# Patient Record
Sex: FEMALE | Race: WHITE
[De-identification: names, ages, dates, MRNs, and addresses within clinical notes are randomized per-mention and may not be internally consistent; named-entity substitution may affect disease eponyms.]

---

## 2017-03-29 ENCOUNTER — RESULT REVIEW (OUTPATIENT)
Age: 62
End: 2017-03-29

## 2018-04-12 ENCOUNTER — RESULT REVIEW (OUTPATIENT)
Age: 63
End: 2018-04-12

## 2019-05-10 ENCOUNTER — RESULT REVIEW (OUTPATIENT)
Age: 64
End: 2019-05-10

## 2019-06-07 ENCOUNTER — APPOINTMENT (OUTPATIENT)
Dept: PULMONOLOGY | Facility: CLINIC | Age: 64
End: 2019-06-07

## 2019-07-02 ENCOUNTER — APPOINTMENT (OUTPATIENT)
Dept: PULMONOLOGY | Facility: CLINIC | Age: 64
End: 2019-07-02
Payer: MEDICARE

## 2019-07-02 VITALS
DIASTOLIC BLOOD PRESSURE: 70 MMHG | WEIGHT: 185 LBS | HEART RATE: 94 BPM | OXYGEN SATURATION: 98 % | SYSTOLIC BLOOD PRESSURE: 140 MMHG | BODY MASS INDEX: 30.82 KG/M2 | HEIGHT: 65 IN

## 2019-07-02 DIAGNOSIS — Z87.891 PERSONAL HISTORY OF NICOTINE DEPENDENCE: ICD-10-CM

## 2019-07-02 DIAGNOSIS — Z85.43 PERSONAL HISTORY OF MALIGNANT NEOPLASM OF OVARY: ICD-10-CM

## 2019-07-02 DIAGNOSIS — Z82.49 FAMILY HISTORY OF ISCHEMIC HEART DISEASE AND OTHER DISEASES OF THE CIRCULATORY SYSTEM: ICD-10-CM

## 2019-07-02 DIAGNOSIS — Z83.3 FAMILY HISTORY OF DIABETES MELLITUS: ICD-10-CM

## 2019-07-02 DIAGNOSIS — H81.09 MENIERE'S DISEASE, UNSPECIFIED EAR: ICD-10-CM

## 2019-07-02 PROCEDURE — 94729 DIFFUSING CAPACITY: CPT

## 2019-07-02 PROCEDURE — 85018 HEMOGLOBIN: CPT | Mod: QW

## 2019-07-02 PROCEDURE — 99205 OFFICE O/P NEW HI 60 MIN: CPT | Mod: 25

## 2019-07-02 PROCEDURE — 94010 BREATHING CAPACITY TEST: CPT

## 2019-07-02 PROCEDURE — 94727 GAS DIL/WSHOT DETER LNG VOL: CPT

## 2019-07-02 NOTE — REVIEW OF SYSTEMS
[Fatigue] : fatigue [Heartburn] : heartburn [Hypertension] : ~T hypertension [Reflux] : reflux [Indigestion] : indigestion [As Noted in HPI] : as noted in HPI [Unusual Sleep Behavior] : unusual sleep behavior [Dizziness] : dizziness [Negative] : Pulmonary Hypertension

## 2019-07-02 NOTE — PHYSICAL EXAM
[General Appearance - Well Developed] : well developed [Well Groomed] : well groomed [Normal Appearance] : normal appearance [General Appearance - Well Nourished] : well nourished [No Deformities] : no deformities [General Appearance - In No Acute Distress] : no acute distress [Normal Conjunctiva] : the conjunctiva exhibited no abnormalities [Eyelids - No Xanthelasma] : the eyelids demonstrated no xanthelasmas [Normal Oropharynx] : normal oropharynx [Neck Appearance] : the appearance of the neck was normal [Jugular Venous Distention Increased] : there was no jugular-venous distention [Neck Cervical Mass (___cm)] : no neck mass was observed [Thyroid Nodule] : there were no palpable thyroid nodules [Heart Rate And Rhythm] : heart rate and rhythm were normal [Thyroid Diffuse Enlargement] : the thyroid was not enlarged [Heart Sounds] : normal S1 and S2 [Murmurs] : no murmurs present [Respiration, Rhythm And Depth] : normal respiratory rhythm and effort [Auscultation Breath Sounds / Voice Sounds] : lungs were clear to auscultation bilaterally [Exaggerated Use Of Accessory Muscles For Inspiration] : no accessory muscle use [Abdomen Tenderness] : non-tender [Abdomen Soft] : soft [Abdomen Mass (___ Cm)] : no abdominal mass palpated [Abnormal Walk] : normal gait [Gait - Sufficient For Exercise Testing] : the gait was sufficient for exercise testing [Nail Clubbing] : no clubbing of the fingernails [Petechial Hemorrhages (___cm)] : no petechial hemorrhages [] : no ischemic changes [Cyanosis, Localized] : no localized cyanosis [Oriented To Time, Place, And Person] : oriented to person, place, and time [Impaired Insight] : insight and judgment were intact [No Focal Deficits] : no focal deficits [Affect] : the affect was normal

## 2019-07-03 NOTE — REASON FOR VISIT
[Consultation] : a consultation visit [Chest Pain] : chest Pain [Shortness of Breath] : shortness of Breath [Cough] : cough

## 2019-07-03 NOTE — CONSULT LETTER
[Dear  ___] : Dear  [unfilled], [Please see my note below.] : Please see my note below. [Consult Letter:] : I had the pleasure of evaluating your patient, [unfilled]. [Consult Closing:] : Thank you very much for allowing me to participate in the care of this patient.  If you have any questions, please do not hesitate to contact me. [DrIssa  ___] : Dr. VELASCO [Sincerely,] : Sincerely, [FreeTextEntry3] : Pérez Haynes MD\par

## 2019-07-15 LAB — DEPRECATED D DIMER PPP IA-ACNC: <150 NG/ML DDU

## 2019-08-14 ENCOUNTER — APPOINTMENT (OUTPATIENT)
Dept: PULMONOLOGY | Facility: CLINIC | Age: 64
End: 2019-08-14
Payer: MEDICARE

## 2019-08-14 VITALS — DIASTOLIC BLOOD PRESSURE: 68 MMHG | SYSTOLIC BLOOD PRESSURE: 100 MMHG | OXYGEN SATURATION: 97 % | HEART RATE: 101 BPM

## 2019-08-14 VITALS — WEIGHT: 182 LBS | BODY MASS INDEX: 30.32 KG/M2 | HEIGHT: 65 IN

## 2019-08-14 DIAGNOSIS — R07.89 OTHER CHEST PAIN: ICD-10-CM

## 2019-08-14 DIAGNOSIS — K21.9 GASTRO-ESOPHAGEAL REFLUX DISEASE W/OUT ESOPHAGITIS: ICD-10-CM

## 2019-08-14 DIAGNOSIS — R05 COUGH: ICD-10-CM

## 2019-08-14 PROCEDURE — 99213 OFFICE O/P EST LOW 20 MIN: CPT

## 2019-08-14 NOTE — REVIEW OF SYSTEMS
[Hypertension] : ~T hypertension [Heartburn] : heartburn [Reflux] : reflux [Indigestion] : indigestion [Dizziness] : dizziness [As Noted in HPI] : as noted in HPI [Unusual Sleep Behavior] : unusual sleep behavior [Negative] : Pulmonary Hypertension

## 2019-08-14 NOTE — PHYSICAL EXAM
[General Appearance - Well Developed] : well developed [Normal Appearance] : normal appearance [Well Groomed] : well groomed [General Appearance - Well Nourished] : well nourished [No Deformities] : no deformities [General Appearance - In No Acute Distress] : no acute distress [Normal Conjunctiva] : the conjunctiva exhibited no abnormalities [Eyelids - No Xanthelasma] : the eyelids demonstrated no xanthelasmas [Normal Oropharynx] : normal oropharynx [II] : II [Neck Cervical Mass (___cm)] : no neck mass was observed [Neck Appearance] : the appearance of the neck was normal [Jugular Venous Distention Increased] : there was no jugular-venous distention [Thyroid Diffuse Enlargement] : the thyroid was not enlarged [Thyroid Nodule] : there were no palpable thyroid nodules [Heart Rate And Rhythm] : heart rate and rhythm were normal [Heart Sounds] : normal S1 and S2 [Murmurs] : no murmurs present [Respiration, Rhythm And Depth] : normal respiratory rhythm and effort [Exaggerated Use Of Accessory Muscles For Inspiration] : no accessory muscle use [Auscultation Breath Sounds / Voice Sounds] : lungs were clear to auscultation bilaterally [Abdomen Soft] : soft [Abdomen Tenderness] : non-tender [Abdomen Mass (___ Cm)] : no abdominal mass palpated [Abnormal Walk] : normal gait [Gait - Sufficient For Exercise Testing] : the gait was sufficient for exercise testing [Nail Clubbing] : no clubbing of the fingernails [Cyanosis, Localized] : no localized cyanosis [Petechial Hemorrhages (___cm)] : no petechial hemorrhages [] : no ischemic changes [No Focal Deficits] : no focal deficits [Oriented To Time, Place, And Person] : oriented to person, place, and time [Impaired Insight] : insight and judgment were intact [Affect] : the affect was normal

## 2019-08-30 ENCOUNTER — RX RENEWAL (OUTPATIENT)
Age: 64
End: 2019-08-30

## 2020-06-15 ENCOUNTER — RESULT REVIEW (OUTPATIENT)
Age: 65
End: 2020-06-15

## 2021-06-17 ENCOUNTER — RESULT REVIEW (OUTPATIENT)
Age: 66
End: 2021-06-17

## 2022-07-19 ENCOUNTER — RESULT REVIEW (OUTPATIENT)
Age: 67
End: 2022-07-19

## 2022-09-29 ENCOUNTER — NON-APPOINTMENT (OUTPATIENT)
Age: 67
End: 2022-09-29

## 2023-07-20 ENCOUNTER — NON-APPOINTMENT (OUTPATIENT)
Age: 68
End: 2023-07-20

## 2023-07-20 DIAGNOSIS — M79.10 MYALGIA, UNSPECIFIED SITE: ICD-10-CM

## 2023-07-20 DIAGNOSIS — Z82.49 FAMILY HISTORY OF ISCHEMIC HEART DISEASE AND OTHER DISEASES OF THE CIRCULATORY SYSTEM: ICD-10-CM

## 2023-07-20 DIAGNOSIS — Z92.89 PERSONAL HISTORY OF OTHER MEDICAL TREATMENT: ICD-10-CM

## 2023-07-20 DIAGNOSIS — Z87.898 PERSONAL HISTORY OF OTHER SPECIFIED CONDITIONS: ICD-10-CM

## 2023-07-20 DIAGNOSIS — R00.2 PALPITATIONS: ICD-10-CM

## 2023-07-20 RX ORDER — LEVOTHYROXINE SODIUM 0.03 MG/1
25 TABLET ORAL DAILY
Refills: 0 | Status: ACTIVE | COMMUNITY

## 2023-07-20 RX ORDER — CETIRIZINE HCL 10 MG
10 TABLET ORAL DAILY
Refills: 0 | Status: ACTIVE | COMMUNITY

## 2023-07-20 RX ORDER — ESTRADIOL 0.1 MG/D
0.1 PATCH TRANSDERMAL
Refills: 0 | Status: ACTIVE | COMMUNITY

## 2023-07-20 RX ORDER — VALSARTAN 320 MG/1
320 TABLET, COATED ORAL DAILY
Refills: 0 | Status: ACTIVE | COMMUNITY

## 2023-07-23 ENCOUNTER — NON-APPOINTMENT (OUTPATIENT)
Age: 68
End: 2023-07-23

## 2023-07-27 ENCOUNTER — NON-APPOINTMENT (OUTPATIENT)
Age: 68
End: 2023-07-27

## 2023-07-31 ENCOUNTER — APPOINTMENT (OUTPATIENT)
Dept: CARDIOLOGY | Facility: CLINIC | Age: 68
End: 2023-07-31
Payer: MEDICARE

## 2023-07-31 VITALS
BODY MASS INDEX: 27.8 KG/M2 | HEIGHT: 66 IN | OXYGEN SATURATION: 98 % | TEMPERATURE: 98.7 F | SYSTOLIC BLOOD PRESSURE: 146 MMHG | HEART RATE: 84 BPM | DIASTOLIC BLOOD PRESSURE: 80 MMHG | WEIGHT: 173 LBS

## 2023-07-31 DIAGNOSIS — R06.02 SHORTNESS OF BREATH: ICD-10-CM

## 2023-07-31 PROCEDURE — 93000 ELECTROCARDIOGRAM COMPLETE: CPT

## 2023-07-31 PROCEDURE — 99214 OFFICE O/P EST MOD 30 MIN: CPT

## 2023-07-31 RX ORDER — RANITIDINE 300 MG/1
300 TABLET ORAL
Qty: 90 | Refills: 3 | Status: DISCONTINUED | COMMUNITY
Start: 2019-07-02 | End: 2023-07-31

## 2023-07-31 RX ORDER — IRBESARTAN AND HYDROCHLOROTHIAZIDE 150; 12.5 MG/1; MG/1
150-12.5 TABLET, FILM COATED ORAL
Refills: 0 | Status: DISCONTINUED | COMMUNITY
End: 2023-07-31

## 2023-07-31 RX ORDER — EZETIMIBE 10 MG/1
10 TABLET ORAL
Refills: 0 | Status: DISCONTINUED | COMMUNITY
End: 2023-07-31

## 2023-07-31 RX ORDER — CLONIDINE HYDROCHLORIDE 0.1 MG/1
0.1 TABLET ORAL TWICE DAILY
Refills: 0 | Status: DISCONTINUED | COMMUNITY
End: 2023-07-31

## 2023-07-31 RX ORDER — POTASSIUM CHLORIDE 1500 MG/1
20 TABLET, EXTENDED RELEASE ORAL
Refills: 0 | Status: DISCONTINUED | COMMUNITY
End: 2023-07-31

## 2023-07-31 RX ORDER — OMEPRAZOLE 40 MG/1
40 CAPSULE, DELAYED RELEASE ORAL
Qty: 1 | Refills: 3 | Status: DISCONTINUED | COMMUNITY
End: 2023-07-31

## 2023-07-31 RX ORDER — ACETAZOLAMIDE 250 MG
250 TABLET ORAL
Refills: 0 | Status: DISCONTINUED | COMMUNITY
End: 2023-07-31

## 2023-07-31 NOTE — CARDIOLOGY SUMMARY
[de-identified] : Active Problems A- Abnormal LFTs: Resolved A- ECHOCARDIOGRAM: 3/24/2022, LVH, EF>65%, mild aortic insufficiency, trace mitral regurgitation and tricuspid regurgitation RVSP 16 - PHARMACOLOGIC NUCLEAR STRESS TEST: 3/29/2022, normal, EF>65% A- CAROTID ULTRASOUND: 3/24/2022, LICA <50, RECA <50. Statin intolerant A- Hypercholesterolemia: Statin intolerant A- Hypertension: Home blood pressure cuff accurate 2023, abnormal secondary workup 07/2007. Reported normal endocrine evaluation. Intolerant of diltiazem >240 mg, terazosin, and clonidine. Unable to take beta blockers secondary to ongoing allergy shots. A- Palpitations: EVENT MONITOR: 7/07, symptomatic PVCs and couplets A- Family History of premature CAD: Father MI 50s - GERD

## 2023-07-31 NOTE — PHYSICAL EXAM

## 2023-07-31 NOTE — DISCUSSION/SUMMARY
[FreeTextEntry1] : 1 hypertension The patient was instructed to check and record her home blood pressure readings. Diet, exercise, and weight loss discussed. Telephone telemedicine visit in 1 month. If further blood pressure control is needed we will consider the addition of hydralazine since the patient is intolerant of terazosin and clonidine.  She is on a calcium channel blocker and an ARB.  She cannot tolerate beta-blockers. 2 hypercholesterolemia Statin intolerant. PCSK9 inhibitors declined 3 Carotid artery disease Nonobstructive 4 abnormal ECG status post normal nuclear stress test 3/29/2022 [EKG obtained to assist in diagnosis and management of assessed problem(s)] : EKG obtained to assist in diagnosis and management of assessed problem(s)

## 2023-07-31 NOTE — HISTORY OF PRESENT ILLNESS
[FreeTextEntry1] : The patient is a 68-year-old white female with a past medical history remarkable for hypertension, hypercholesterolemia, carotid artery disease, a family history of premature coronary artery disease, and an abnormal ECG. The patient reports dyspnea that occurs while mowing her lawn.  She attributes this to her allergies.  She is followed by pulmonary and allergy.  She is chest pain-free. The patient has not been recording or checking her home blood pressure readings. Her cholesterol from 1/25/2023 was elevated.

## 2023-09-29 RX ORDER — ACETAZOLAMIDE 250 MG/1
250 TABLET ORAL TWICE DAILY
Qty: 180 | Refills: 1 | Status: ACTIVE | COMMUNITY
Start: 1900-01-01 | End: 1900-01-01

## 2023-10-03 ENCOUNTER — APPOINTMENT (OUTPATIENT)
Dept: CARDIOLOGY | Facility: CLINIC | Age: 68
End: 2023-10-03

## 2023-11-27 LAB
ALT SERPL-CCNC: 20 U/L
ANION GAP SERPL CALC-SCNC: 14 MMOL/L
AST SERPL-CCNC: 17 U/L
BUN SERPL-MCNC: 16 MG/DL
CALCIUM SERPL-MCNC: 10 MG/DL
CHLORIDE SERPL-SCNC: 107 MMOL/L
CHOLEST SERPL-MCNC: 219 MG/DL
CO2 SERPL-SCNC: 20 MMOL/L
CREAT SERPL-MCNC: 1.13 MG/DL
EGFR: 53 ML/MIN/1.73M2
GLUCOSE SERPL-MCNC: 94 MG/DL
HDLC SERPL-MCNC: 53 MG/DL
LDLC SERPL CALC-MCNC: 141 MG/DL
NONHDLC SERPL-MCNC: 166 MG/DL
POTASSIUM SERPL-SCNC: 3.7 MMOL/L
SODIUM SERPL-SCNC: 140 MMOL/L
TRIGL SERPL-MCNC: 139 MG/DL

## 2023-12-19 ENCOUNTER — NON-APPOINTMENT (OUTPATIENT)
Age: 68
End: 2023-12-19

## 2024-01-17 ENCOUNTER — NON-APPOINTMENT (OUTPATIENT)
Age: 69
End: 2024-01-17

## 2024-02-08 ENCOUNTER — NON-APPOINTMENT (OUTPATIENT)
Age: 69
End: 2024-02-08

## 2024-02-08 ENCOUNTER — APPOINTMENT (OUTPATIENT)
Dept: CARDIOLOGY | Facility: CLINIC | Age: 69
End: 2024-02-08
Payer: MEDICARE

## 2024-02-08 VITALS
OXYGEN SATURATION: 98 % | DIASTOLIC BLOOD PRESSURE: 80 MMHG | BODY MASS INDEX: 27.48 KG/M2 | SYSTOLIC BLOOD PRESSURE: 162 MMHG | WEIGHT: 171 LBS | HEIGHT: 66 IN | HEART RATE: 86 BPM

## 2024-02-08 PROCEDURE — 93000 ELECTROCARDIOGRAM COMPLETE: CPT

## 2024-02-08 PROCEDURE — 99214 OFFICE O/P EST MOD 30 MIN: CPT

## 2024-02-08 RX ORDER — HYDRALAZINE HYDROCHLORIDE 10 MG/1
10 TABLET ORAL
Qty: 270 | Refills: 0 | Status: ACTIVE | COMMUNITY
Start: 2024-02-08 | End: 1900-01-01

## 2024-02-08 NOTE — CARDIOLOGY SUMMARY
[de-identified] :  - Abnormal LFTs: Resolved - ECHOCARDIOGRAM: 3/24/2022, LVH, EF>65%, mild aortic insufficiency, trace mitral regurgitation and tricuspid regurgitation RVSP 16 - PHARMACOLOGIC NUCLEAR STRESS TEST: 3/29/2022, normal, EF>65% - CAROTID ULTRASOUND: 3/24/2022, LICA <50, RECA <50. Statin intolerant - Hypercholesterolemia: Statin intolerant - Hypertension: Home blood pressure cuff accurate 2023, abnormal secondary workup 07/2007. Reported normal endocrine evaluation. Intolerant of diltiazem >240 mg, terazosin, and clonidine. Unable to take beta blockers secondary to ongoing allergy shots. - Palpitations: EVENT MONITOR: 7/07, symptomatic PVCs and couplets - Family History of premature CAD: Father MI 50s - GERD

## 2024-02-08 NOTE — HISTORY OF PRESENT ILLNESS
[FreeTextEntry1] : The patient is a 68-year-old white female with a past medical history remarkable for hypertension, hypercholesterolemia, carotid artery disease, a family history of premature coronary artery disease, and an abnormal ECG. The patient's home blood pressure readings were reviewed.  They are elevated. She reports left lower extremity discomfort that occurs while sitting in certain positions.   Her cholesterol from 11/2023 is elevated; however, the patient is statin intolerant and has declined PCSK9 inhibitors.

## 2024-02-08 NOTE — DISCUSSION/SUMMARY
[FreeTextEntry1] : Leg pain  Normal peripheral pulses. 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 Neurology evaluation ordered   hypertension  Uncontrolled. Start hydralazine 10 mg 3 times daily. The patient is intolerant of diltiazem greater than 240 mg, beta-blockers, terazosin, and clonidine. TTM 1 month   hypercholesterolemia Statin intolerant. PCSK9 inhibitors declined  Carotid artery disease Nonobstructive  abnormal ECG status post normal nuclear stress test 3/29/2022 [EKG obtained to assist in diagnosis and management of assessed problem(s)] : EKG obtained to assist in diagnosis and management of assessed problem(s)

## 2024-02-08 NOTE — PHYSICAL EXAM

## 2024-03-27 ENCOUNTER — APPOINTMENT (OUTPATIENT)
Dept: CARDIOLOGY | Facility: CLINIC | Age: 69
End: 2024-03-27
Payer: MEDICARE

## 2024-03-27 VITALS
DIASTOLIC BLOOD PRESSURE: 76 MMHG | HEIGHT: 66 IN | SYSTOLIC BLOOD PRESSURE: 140 MMHG | WEIGHT: 172 LBS | BODY MASS INDEX: 27.64 KG/M2 | OXYGEN SATURATION: 98 % | HEART RATE: 96 BPM

## 2024-03-27 DIAGNOSIS — I77.9 DISORDER OF ARTERIES AND ARTERIOLES, UNSPECIFIED: ICD-10-CM

## 2024-03-27 PROCEDURE — 99214 OFFICE O/P EST MOD 30 MIN: CPT

## 2024-03-27 RX ORDER — BIOTIN 5 MG
CAPSULE ORAL DAILY
Refills: 0 | Status: ACTIVE | COMMUNITY

## 2024-03-27 NOTE — HISTORY OF PRESENT ILLNESS
[FreeTextEntry1] : The patient is a 68-year-old white female with a past medical history remarkable for hypertension, hypercholesterolemia, carotid artery disease, a family history of premature coronary artery disease, and an abnormal ECG. The patient's home blood pressure readings were reviewed at her last visit.  They were elevated.  Due to intolerance of multiple medications hydralazine was added to her medical regimen.  The patient did not start hydralazine. The patient brought her blood pressure cuff to the office today.  It reads 10 mm higher on both the systolic and diastolic. The patient reported lower extremity discomfort when sitting in certain positions.  Due to normal peripheral pulses and neurology evaluation was recommended .   Her cholesterol from 11/2023 is elevated; however, the patient is statin intolerant and has declined PCSK9 inhibitors.

## 2024-03-27 NOTE — DISCUSSION/SUMMARY
[FreeTextEntry1] : Leg pain  Normal peripheral pulses. 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 Neurology evaluation ordered   hypertension  The patient will check and record her home blood pressure readings. Telephone telemedicine visit in 1 month.  If the patient's blood pressure readings are uncontrolled hydralazine will be added to her medical regimen .  hypercholesterolemia Statin intolerant. PCSK9 inhibitors declined  Carotid artery disease Nonobstructive  abnormal ECG status post normal nuclear stress test 3/29/2022  TTM 1 month

## 2024-03-27 NOTE — CARDIOLOGY SUMMARY
[de-identified] : - Abnormal LFTs: Resolved - ECHOCARDIOGRAM: 3/24/2022, LVH, EF>65%, mild aortic insufficiency, trace mitral regurgitation and tricuspid regurgitation RVSP 16 - PHARMACOLOGIC NUCLEAR STRESS TEST: 3/29/2022, normal, EF>65% - CAROTID ULTRASOUND: 3/24/2022, LICA <50, RECA <50. Statin intolerant - Hypercholesterolemia: Statin intolerant - Hypertension: Home blood pressure cuff reads 10 mm higher systolic and diastolic 3/26/2024, abnormal secondary workup 07/2007. Reported normal endocrine evaluation. Intolerant of diltiazem >240 mg, terazosin, and clonidine. Unable to take beta blockers secondary to ongoing allergy shots. - Palpitations: EVENT MONITOR: 7/07, symptomatic PVCs and couplets - Family History of premature CAD: Father MI 50s - GERD

## 2024-03-27 NOTE — PHYSICAL EXAM
[Well Developed] : well developed [Well Nourished] : well nourished [No Acute Distress] : no acute distress [Normal Conjunctiva] : normal conjunctiva [No Carotid Bruit] : no carotid bruit [Normal Venous Pressure] : normal venous pressure [Normal S1, S2] : normal S1, S2 [No Rub] : no rub [No Gallop] : no gallop [Murmur] : murmur [Good Air Entry] : good air entry [Clear Lung Fields] : clear lung fields [Soft] : abdomen soft [No Respiratory Distress] : no respiratory distress  [Non Tender] : non-tender [Normal Bowel Sounds] : normal bowel sounds [No Masses/organomegaly] : no masses/organomegaly [Normal Gait] : normal gait [No Edema] : no edema [No Cyanosis] : no cyanosis [No Clubbing] : no clubbing [No Varicosities] : no varicosities [Normal Radial B/L] : normal radial B/L [Normal DP B/L] : normal DP B/L [No Rash] : no rash [No Skin Lesions] : no skin lesions [Moves all extremities] : moves all extremities [No Focal Deficits] : no focal deficits [Normal Speech] : normal speech [Alert and Oriented] : alert and oriented [Normal memory] : normal memory [de-identified] : 2/6 systolic murmur

## 2024-04-19 ENCOUNTER — RX RENEWAL (OUTPATIENT)
Age: 69
End: 2024-04-19

## 2024-04-19 RX ORDER — DILTIAZEM HYDROCHLORIDE 240 MG/1
240 CAPSULE, EXTENDED RELEASE ORAL
Qty: 90 | Refills: 3 | Status: ACTIVE | COMMUNITY
Start: 1900-01-01 | End: 1900-01-01

## 2024-04-24 ENCOUNTER — APPOINTMENT (OUTPATIENT)
Dept: CARDIOLOGY | Facility: CLINIC | Age: 69
End: 2024-04-24
Payer: MEDICARE

## 2024-04-24 DIAGNOSIS — Z86.79 PERSONAL HISTORY OF OTHER DISEASES OF THE CIRCULATORY SYSTEM: ICD-10-CM

## 2024-04-24 DIAGNOSIS — M79.605 PAIN IN LEFT LEG: ICD-10-CM

## 2024-04-24 DIAGNOSIS — E78.00 PURE HYPERCHOLESTEROLEMIA, UNSPECIFIED: ICD-10-CM

## 2024-04-24 DIAGNOSIS — R94.31 ABNORMAL ELECTROCARDIOGRAM [ECG] [EKG]: ICD-10-CM

## 2024-04-24 PROCEDURE — 99441: CPT | Mod: 93

## 2024-04-24 NOTE — HISTORY OF PRESENT ILLNESS
[FreeTextEntry1] : The patient is a 68-year-old white female with a past medical history remarkable for hypertension, hypercholesterolemia, carotid artery disease, a family history of premature coronary artery disease, and an abnormal ECG. The patient brought her blood pressure cuff to the office at her last visit.  It read 10 mm higher on both the systolic and diastolic.  Patient's home blood pressure readings are normal. The patient reported lower extremity discomfort when sitting in certain positions.  Due to normal peripheral pulses and neurology evaluation was recommended .   Her cholesterol from 11/2023 is elevated; however, the patient is statin intolerant and has declined PCSK9 inhibitors.   I reviewed the results with the patient.  A plan was developed.  All questions were answered.  We spoke on the phone for 5 minutes.  She consented to a telephone telemedicine visit

## 2024-04-24 NOTE — CARDIOLOGY SUMMARY
[de-identified] : - Abnormal LFTs: Resolved - ECHOCARDIOGRAM: 3/24/2022, LVH, EF>65%, mild aortic insufficiency, trace mitral regurgitation and tricuspid regurgitation RVSP 16 - PHARMACOLOGIC NUCLEAR STRESS TEST: 3/29/2022, normal, EF>65% - CAROTID ULTRASOUND: 3/24/2022, LICA <50, RECA <50. Statin intolerant - Hypercholesterolemia: Statin intolerant - Hypertension: Home blood pressure cuff reads 10 mm higher systolic and diastolic 3/26/2024, abnormal secondary workup 07/2007. Reported normal endocrine evaluation. Intolerant of diltiazem >240 mg, terazosin, and clonidine. Unable to take beta blockers secondary to ongoing allergy shots. - Palpitations: EVENT MONITOR: 7/07, symptomatic PVCs and couplets - Family History of premature CAD: Father MI 50s - GERD

## 2024-04-24 NOTE — DISCUSSION/SUMMARY
[FreeTextEntry1] : Leg pain  Normal peripheral pulses. 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 Neurology evaluation ordered   hypertension  Normal home blood pressure readings.  Continue to check and record home blood pressure readings   hypercholesterolemia Statin intolerant. PCSK9 inhibitors declined  Carotid artery disease Nonobstructive  abnormal ECG status post normal nuclear stress test 3/29/2022  RTO 1 year

## 2024-06-30 ENCOUNTER — NON-APPOINTMENT (OUTPATIENT)
Age: 69
End: 2024-06-30

## 2024-09-18 ENCOUNTER — NON-APPOINTMENT (OUTPATIENT)
Age: 69
End: 2024-09-18

## 2024-10-21 ENCOUNTER — APPOINTMENT (OUTPATIENT)
Dept: PULMONOLOGY | Facility: CLINIC | Age: 69
End: 2024-10-21

## 2024-10-28 ENCOUNTER — APPOINTMENT (OUTPATIENT)
Dept: PULMONOLOGY | Facility: CLINIC | Age: 69
End: 2024-10-28

## 2025-04-24 ENCOUNTER — APPOINTMENT (OUTPATIENT)
Dept: CARDIOLOGY | Facility: CLINIC | Age: 70
End: 2025-04-24
Payer: MEDICARE

## 2025-04-24 VITALS
HEIGHT: 66 IN | BODY MASS INDEX: 27.8 KG/M2 | SYSTOLIC BLOOD PRESSURE: 160 MMHG | OXYGEN SATURATION: 98 % | DIASTOLIC BLOOD PRESSURE: 80 MMHG | WEIGHT: 173 LBS | HEART RATE: 77 BPM

## 2025-04-24 DIAGNOSIS — E78.00 PURE HYPERCHOLESTEROLEMIA, UNSPECIFIED: ICD-10-CM

## 2025-04-24 DIAGNOSIS — Z86.79 PERSONAL HISTORY OF OTHER DISEASES OF THE CIRCULATORY SYSTEM: ICD-10-CM

## 2025-04-24 DIAGNOSIS — R05.9 COUGH, UNSPECIFIED: ICD-10-CM

## 2025-04-24 DIAGNOSIS — R94.31 ABNORMAL ELECTROCARDIOGRAM [ECG] [EKG]: ICD-10-CM

## 2025-04-24 DIAGNOSIS — I77.9 DISORDER OF ARTERIES AND ARTERIOLES, UNSPECIFIED: ICD-10-CM

## 2025-04-24 PROCEDURE — 99214 OFFICE O/P EST MOD 30 MIN: CPT

## 2025-04-24 PROCEDURE — 93000 ELECTROCARDIOGRAM COMPLETE: CPT
